# Patient Record
Sex: FEMALE | Race: WHITE | HISPANIC OR LATINO | Employment: PART TIME | URBAN - METROPOLITAN AREA
[De-identification: names, ages, dates, MRNs, and addresses within clinical notes are randomized per-mention and may not be internally consistent; named-entity substitution may affect disease eponyms.]

---

## 2023-08-27 ENCOUNTER — APPOINTMENT (OUTPATIENT)
Dept: RADIOLOGY | Facility: MEDICAL CENTER | Age: 43
End: 2023-08-27
Attending: EMERGENCY MEDICINE
Payer: COMMERCIAL

## 2023-08-27 ENCOUNTER — HOSPITAL ENCOUNTER (EMERGENCY)
Facility: MEDICAL CENTER | Age: 43
End: 2023-08-27
Attending: EMERGENCY MEDICINE
Payer: COMMERCIAL

## 2023-08-27 VITALS
OXYGEN SATURATION: 96 % | SYSTOLIC BLOOD PRESSURE: 103 MMHG | DIASTOLIC BLOOD PRESSURE: 60 MMHG | TEMPERATURE: 98.2 F | HEIGHT: 62 IN | BODY MASS INDEX: 15.53 KG/M2 | RESPIRATION RATE: 17 BRPM | HEART RATE: 62 BPM | WEIGHT: 84.4 LBS

## 2023-08-27 DIAGNOSIS — R07.89 CHEST WALL PAIN: ICD-10-CM
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
## 2023-08-27 LAB — EKG IMPRESSION: NORMAL

## 2023-08-27 PROCEDURE — 99284 EMERGENCY DEPT VISIT MOD MDM: CPT

## 2023-08-27 PROCEDURE — 71045 X-RAY EXAM CHEST 1 VIEW: CPT

## 2023-08-27 PROCEDURE — 93005 ELECTROCARDIOGRAM TRACING: CPT

## 2023-08-27 PROCEDURE — 93005 ELECTROCARDIOGRAM TRACING: CPT | Performed by: EMERGENCY MEDICINE

## 2023-08-27 ASSESSMENT — PAIN DESCRIPTION - PAIN TYPE: TYPE: ACUTE PAIN

## 2023-08-28 NOTE — ED NOTES
Pt discharged. GCS 15. Pt in possession of belongings. Pt provided discharge education and information pertaining to medications and follow up appointments. Pt received copy of discharge instructions and verbalized understanding.
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
## 2023-08-28 NOTE — DISCHARGE INSTRUCTIONS
Use Tylenol and Motrin if needed for discomfort you can also try warm and cold packs on the area that hurts.  Symptoms should resolve over the next week or so.  If you feel you are developing new or worsening symptoms return here for recheck

## 2023-08-28 NOTE — ED NOTES
Assumed patient care. Verified patient identification.   Patient AAO X 4, pt with unlabored respirations. Discussed plan of care. Call light within reach.